# Patient Record
Sex: FEMALE | Employment: FULL TIME | URBAN - METROPOLITAN AREA
[De-identification: names, ages, dates, MRNs, and addresses within clinical notes are randomized per-mention and may not be internally consistent; named-entity substitution may affect disease eponyms.]

---

## 2024-02-20 ENCOUNTER — OFFICE VISIT (OUTPATIENT)
Dept: ORTHOPEDIC SURGERY | Age: 79
End: 2024-02-20
Payer: MEDICARE

## 2024-02-20 VITALS — BODY MASS INDEX: 30.26 KG/M2 | HEIGHT: 63 IN | WEIGHT: 170.8 LBS

## 2024-02-20 DIAGNOSIS — M25.551 RIGHT HIP PAIN: Primary | ICD-10-CM

## 2024-02-20 PROCEDURE — 99204 OFFICE O/P NEW MOD 45 MIN: CPT | Performed by: ORTHOPAEDIC SURGERY

## 2024-02-20 PROCEDURE — 1123F ACP DISCUSS/DSCN MKR DOCD: CPT | Performed by: ORTHOPAEDIC SURGERY

## 2024-02-20 NOTE — PROGRESS NOTES
Patient ID:  Kallie Colvin  [id number]  78 y.o.  1945    Today: February 20, 2024          Chief Complaint: Right Hip pain    HPI:       Kallie Colvin is a 78 y.o. 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 The patient underwent hip arthroplasty approximately 6 years ago. Patient reports  that her hip really isnt very painful. It just feels full., Further, the patient denies any recent fall or trauma.  Further the patient reports that they have previously attempted Activity modification, with significant relief.    The pain affects the patient’s activities of daily living and quality of life.       Past Medical History:  No past medical history on file.    Past Surgical History:  No past surgical history on file.     Medications:     Prior to Admission medications    Not on File       Family History:   No family history on file.    Social History:      Social History     Tobacco Use    Smoking status: Not on file    Smokeless tobacco: Not on file   Substance Use Topics    Alcohol use: Not on file         Allergies:    Not on File     Vitals:   Ht 1.6 m (5' 3\")   Wt 77.5 kg (170 lb 12.8 oz)   BMI 30.26 kg/m²     ROS:   Review of systems is done with pertinent positives and negative noted relating to orthopaedic issues. Non-orthopaedic issues are deferred to PCP      Objective:   General: Patient is awake and in no acute distress  Psych: Mood and affect appropriate  HEENT: Normocephalic. Atramatic. Pupils equal, round and reactive. Sclera normal.   Neck: Supple without obvious mass   Chest: Symmetric  Lungs:  Breathing non-labored. No tachypnea noted.  Abdomen: Soft on gross examination without obvious distention.  Neuro: No obvious neurologic deficit. Grossly moves bilateral upper extremities without motor or sensory deficits. No gross weakness noted in the lower extremities. No hyporeflexia or hyperreflexia noted.  Vascular: No gross arterial or venous deficiency noted.

## 2024-05-06 ENCOUNTER — EVALUATION (OUTPATIENT)
Age: 79
End: 2024-05-06
Payer: MEDICARE

## 2024-05-06 DIAGNOSIS — M25.551 RIGHT HIP PAIN: Primary | ICD-10-CM

## 2024-05-06 DIAGNOSIS — M62.81 MUSCLE WEAKNESS: ICD-10-CM

## 2024-05-06 DIAGNOSIS — R26.2 DIFFICULTY WALKING: ICD-10-CM

## 2024-05-06 PROCEDURE — 97162 PT EVAL MOD COMPLEX 30 MIN: CPT | Performed by: PHYSICAL THERAPIST

## 2024-05-06 PROCEDURE — 97110 THERAPEUTIC EXERCISES: CPT | Performed by: PHYSICAL THERAPIST

## 2024-05-06 RX ORDER — LOSARTAN POTASSIUM 100 MG/1
TABLET ORAL
COMMUNITY

## 2024-05-06 NOTE — PROGRESS NOTES
patient.  Kallie Colvin is a 78 y.o. female who presents to therapy today with evolving/changing clinical presentation (moderate complexity)  related to R hip and buttock pain with leg length discrepancy s/p R SAHHBAZ and weakness impacting gait. Pt would benefit from skilled physical therapy services to address the deficits noted above for return to prior level of function.    PLAN OF CARE     Effective Dates/Duration: 5/6/2024 TO 7/5/2024 (60 days).    Frequency: 2x/week   Interventions may include but are not limited to:   (04994) Therapeutic exercise to develop ROM, strength, endurance and flexibility  (70816) Therapeutic activities using dynamic activities to improve function  (58335) Gait training to address mechanics, proper step length and weight shifting to improve household and community mobility as well as overall safety with ADLs  (84594) Manual therapy techniques to improve joint and/or soft tissue mobility, ROM, and function as well as helping to decrease pain/spasms and swelling  Home exercise program (HEP) development    The referring medical provider has reviewed and approved this evaluation and plan of care as noted by the electronic signature attached to note.    GOALS     Short term goals to be met by 6/3/2024  (4 weeks):  Patient will demonstrate good recall of HEP requiring no more than minimal verbal cuing for proper form and technique.  Pt will demonstrate AROM of R hip ER to at least 15° to improve ability to to put socks/shoes on.  Pt will increase strength to at least 3/5 R hip ER and abduction for improved stability in gait.  Pt will improve Timed Up and Go to </=11 seconds for improved community mobility.    Long term goals to be met by 7/5/2024 (60 days):  Patient will be compliant and independent with a comprehensive HEP and activity progression.  Pt will demonstrate AROM of R hip ER to at least 20° to improve ability to to put socks/shoes on.  Pt will increase strength to at least 3+/5

## 2024-05-08 ENCOUNTER — TREATMENT (OUTPATIENT)
Age: 79
End: 2024-05-08
Payer: MEDICARE

## 2024-05-08 DIAGNOSIS — M62.81 MUSCLE WEAKNESS: ICD-10-CM

## 2024-05-08 DIAGNOSIS — R26.2 DIFFICULTY WALKING: ICD-10-CM

## 2024-05-08 DIAGNOSIS — M25.551 RIGHT HIP PAIN: Primary | ICD-10-CM

## 2024-05-08 PROCEDURE — 97140 MANUAL THERAPY 1/> REGIONS: CPT | Performed by: PHYSICAL THERAPIST

## 2024-05-08 PROCEDURE — 97110 THERAPEUTIC EXERCISES: CPT | Performed by: PHYSICAL THERAPIST

## 2024-05-08 NOTE — PROGRESS NOTES
[facility] ORTHOPAED[facility]  [address] [address]  Dept: [phone]      Physical Therapy Daily Note     Referring MD: Brian Bill MD  Diagnosis:     ICD-10-CM    1. Right hip pain  M25.551       2. Muscle weakness  M62.81       3. Difficulty walking  R26.2          Therapy precautions:None  Co-morbidities affecting plan of care: s/p R SHAHBAZ in [facility] ~2018 resulting in RLE shorter than L- awaiting shoe lift, diabetes  Chief complaints/history of injury:Pt had a R SHAHBAZ ~ 6 6 years ago resulting in shorter R leg and limp with walking. Pt had the surgery in [facility] and never had PT. Pt had no pain after initial recovery until ~ 40/2024 when patient started walking more and noted increase in pain.  Describe current symptoms: Pain R posterior hip and into R groin, weakness > pain, difficulty with sit-stand, daughter's main concern is stability with standing/walking  Patient Stated Goals: increase stability when walking, get up from a chair and complete stairs more easily    Payor: Payor: [facility] MEDICARE /  /  /  Billing pattern: Government- total time    Total Timed Procedure Codes: 38 min, Total Time: 38 min Modifier needed: No  8:00 am-8:38 am   Episode visit count:  2   AMN video  used, daughter present    SUBJECTIVE     Pt reports no hip pain currently. She has completed her home program with muscle soreness lateral R hip but no pain.      Medications: no changes since last session    OBJECTIVE     Treatment provided today:  Therapeutic exercise (52278) x 30 min to develop ROM, strength, endurance and flexibility of the RLE.  Nustep level 4 x 5 min  Hooklying hip adductor stretch H20sec x 3  Hooklying adductor squeeze H5sec/relax for 2 min  Beginner bridge 2x8 w/ isometric hip ER against orange R-loop  Supine long axis R hip IR/ER x 20  Manual stretching R hamstrings and hip  Standing heel raises 2x10  B gastroc stretch on incline board H1min x 2  Standing hip

## 2024-05-14 ENCOUNTER — TREATMENT (OUTPATIENT)
Age: 79
End: 2024-05-14
Payer: MEDICARE

## 2024-05-14 DIAGNOSIS — M25.551 RIGHT HIP PAIN: Primary | ICD-10-CM

## 2024-05-14 DIAGNOSIS — M62.81 MUSCLE WEAKNESS: ICD-10-CM

## 2024-05-14 DIAGNOSIS — R26.2 DIFFICULTY WALKING: ICD-10-CM

## 2024-05-14 PROCEDURE — 97140 MANUAL THERAPY 1/> REGIONS: CPT | Performed by: PHYSICAL THERAPIST

## 2024-05-14 PROCEDURE — 97110 THERAPEUTIC EXERCISES: CPT | Performed by: PHYSICAL THERAPIST

## 2024-05-14 NOTE — PROGRESS NOTES
[facility] ORTHOPAED[facility]  [address] [address]  Dept: [phone]      Physical Therapy Daily Note     Referring MD: Brian Blil MD  Diagnosis:     ICD-10-CM    1. Right hip pain  M25.551       2. Muscle weakness  M62.81       3. Difficulty walking  R26.2            Therapy precautions:None  Co-morbidities affecting plan of care: s/p R SHAHBAZ in [facility] ~2018 resulting in RLE shorter than L- awaiting shoe lift, diabetes  Chief complaints/history of injury:Pt had a R SHAHBAZ ~ 6 6 years ago resulting in shorter R leg and limp with walking. Pt had the surgery in [facility] and never had PT. Pt had no pain after initial recovery until ~ 40/2024 when patient started walking more and noted increase in pain.  Describe current symptoms: Pain R posterior hip and into R groin, weakness > pain, difficulty with sit-stand, daughter's main concern is stability with standing/walking  Patient Stated Goals: increase stability when walking, get up from a chair and complete stairs more easily    Payor: Payor: [facility] MEDICARE /  /  /  Billing pattern: Government- total time    Total Timed Procedure Codes: 40 min, Total Time: 40 min Modifier needed: No  Episode visit count:  3   AMN video  used    SUBJECTIVE     Pt reports that her hip is much better. She is doing well with her exercises but notes some pain into the R groin and hip with marching in supine to position for exercises.     Medications: no changes since last session    OBJECTIVE     Treatment provided today:  Therapeutic exercise (93004) x 32 min to develop ROM, strength, endurance and flexibility of the RLE.  Current Exercises Past Exercises (not performed today)   Assessment of tolerance of previous therapy session  Nustep level 4 x 5 min  Hooklying hip adductor stretch H20sec x 3  Hooklying adductor squeeze H5sec/relax for 2 min  Beginner bridge 3x5 w/ isometric hip ER against orange R-loop  Sidelying clamshell x 10 B  Supine

## 2024-05-16 ENCOUNTER — TREATMENT (OUTPATIENT)
Age: 79
End: 2024-05-16

## 2024-05-16 DIAGNOSIS — R26.2 DIFFICULTY WALKING: ICD-10-CM

## 2024-05-16 DIAGNOSIS — M62.81 MUSCLE WEAKNESS: ICD-10-CM

## 2024-05-16 DIAGNOSIS — M25.551 RIGHT HIP PAIN: Primary | ICD-10-CM

## 2024-05-16 NOTE — PROGRESS NOTES
[facility] INT [facility] ORTHOPAED[facility]  [address] [address]  Dept: [phone]      Physical Therapy Daily Note     Referring MD: Brian Bill MD  Diagnosis:     ICD-10-CM    1. Right hip pain  M25.551       2. Muscle weakness  M62.81       3. Difficulty walking  R26.2            Therapy precautions:None  Co-morbidities affecting plan of care: s/p R SHAHBAZ in [facility] ~2018 resulting in RLE shorter than L- awaiting shoe lift, diabetes  Chief complaints/history of injury:Pt had a R SHAHBAZ ~ 6 6 years ago resulting in shorter R leg and limp with walking. Pt had the surgery in [facility] and never had PT. Pt had no pain after initial recovery until ~ 40/2024 when patient started walking more and noted increase in pain.  Describe current symptoms: Pain R posterior hip and into R groin, weakness > pain, difficulty with sit-stand, daughter's main concern is stability with standing/walking  Patient Stated Goals: increase stability when walking, get up from a chair and complete stairs more easily    Payor: Payor: [employer] MEDICARE /  /  /  Billing pattern: Government- total time    Total Timed Procedure Codes: 40 min, Total Time: 40 min Modifier needed: No  Episode visit count:  4   AMN video  used    SUBJECTIVE     Pt reports that she is happy with the movement and decrease in pain R hip. She has difficulty putting her R sock/shoe on.     Medications: no changes since last session    OBJECTIVE     Treatment provided today:  Therapeutic exercise (58833) x 30 min to develop ROM, strength, endurance and flexibility of the RLE.  Current Exercises Past Exercises (not performed today)   Assessment of tolerance of previous therapy session  Nustep level 4 x 5 min  Hooklying hip adductor stretch H10sec x 10  Supine long axis R hip ER against yellow loop band x 20  Manual stretching R hamstrings and hip  Short arc quad 2x10 w/ focus on full extension  TKE against cook band H5sec/relax for 1 min  Standing

## 2024-05-20 ENCOUNTER — TREATMENT (OUTPATIENT)
Age: 79
End: 2024-05-20
Payer: MEDICARE

## 2024-05-20 DIAGNOSIS — M62.81 MUSCLE WEAKNESS: ICD-10-CM

## 2024-05-20 DIAGNOSIS — R26.2 DIFFICULTY WALKING: ICD-10-CM

## 2024-05-20 DIAGNOSIS — M25.551 RIGHT HIP PAIN: Primary | ICD-10-CM

## 2024-05-20 PROCEDURE — 97110 THERAPEUTIC EXERCISES: CPT | Performed by: PHYSICAL THERAPIST

## 2024-05-20 PROCEDURE — 97140 MANUAL THERAPY 1/> REGIONS: CPT | Performed by: PHYSICAL THERAPIST

## 2024-05-20 PROCEDURE — M5044 MISC THERA-BAND/TUBING: HCPCS | Performed by: PHYSICAL THERAPIST

## 2024-05-20 NOTE — PROGRESS NOTES
[facility] ORTHOPAED[facility]  [address] [address]  Dept: [phone]      Physical Therapy Daily Note     Referring MD: Brian Bill MD  Diagnosis:     ICD-10-CM    1. Right hip pain  M25.551       2. Muscle weakness  M62.81       3. Difficulty walking  R26.2            Therapy precautions:None  Co-morbidities affecting plan of care: s/p R SHAHBAZ in [facility] ~2018 resulting in RLE shorter than L- awaiting shoe lift, diabetes  Chief complaints/history of injury:Pt had a R SHAHBAZ ~ 6 6 years ago resulting in shorter R leg and limp with walking. Pt had the surgery in [facility] and never had PT. Pt had no pain after initial recovery until ~ 4/2024 when patient started walking more and noted increase in pain.  Describe current symptoms: Pain R posterior hip and into R groin, weakness > pain, difficulty with sit-stand, daughter's main concern is stability with standing/walking  Patient Stated Goals: increase stability when walking, get up from a chair and complete stairs more easily    Payor: Payor: [employer] MEDICARE /  /  /  Billing pattern: Government- total time    Total Timed Procedure Codes: 40 min, Total Time: 40 min Modifier needed: No  Episode visit count:  5   AMN video  used    SUBJECTIVE     Pt reports that it still hurts in the front and back of hip with walking but balance is a lot better. Pt has not received her shoe lift yet.     Medications: no changes since last session    OBJECTIVE     Treatment provided today:  Therapeutic exercise (69594) x 30 min to develop ROM, strength, endurance and flexibility of the RLE.  Current Exercises Past Exercises (not performed today)   Assessment of tolerance of previous therapy session  Nustep level 4 x 5 min  Hooklying hip adductor stretch H10sec x 10  Supine long axis R hip ER against yellow loop band x 20  Manual stretching R hamstrings and hip  R modified antoni stretch with therapist overpressure  Bridge 2x10  Standing hip

## 2024-05-22 ENCOUNTER — TELEPHONE (OUTPATIENT)
Age: 79
End: 2024-05-22

## 2024-05-22 NOTE — TELEPHONE ENCOUNTER
Attempted to call no answer, no vm, no mychart was asking if patient wanted to come in today at 11:45am or 5/23 @ 2:15am

## 2024-05-23 NOTE — PROGRESS NOTES
[facility] INT [facility] ORTHOPAEDICS  [address] [facility] [address]  Dept: [phone]      Physical Therapy Daily Note     Referring MD: Brian Bill MD  Diagnosis:     ICD-10-CM    1. Right hip pain  M25.551       2. Muscle weakness  M62.81       3. Difficulty walking  R26.2            Therapy precautions:None  Co-morbidities affecting plan of care: s/p R SHAHBAZ in [facility] ~2018 resulting in RLE shorter than L- awaiting shoe lift, diabetes  Chief complaints/history of injury:Pt had a R SHAHBAZ ~ 6 6 years ago resulting in shorter R leg and limp with walking. Pt had the surgery in [facility] and never had PT. Pt had no pain after initial recovery until ~ 4/2024 when patient started walking more and noted increase in pain.  Describe current symptoms: Pain R posterior hip and into R groin, weakness > pain, difficulty with sit-stand, daughter's main concern is stability with standing/walking  Patient Stated Goals: increase stability when walking, get up from a chair and complete stairs more easily    Payor: Payor: [facility] MEDICARE /  /  /  Billing pattern: Government- total time    Total Timed Procedure Codes: 40 min, Total Time: 40 min Modifier needed: No  Episode visit count:  6   AMN video  declined, daughter present during session    SUBJECTIVE     Daughter present and states that patient has not ordered a shoe lift (misunderstanding from evaluation). R leg does feel shorter. Pt with increased soreness R low back/lateral hip ~ 2 days ago but yesterday was the worst day.    Medications: no changes since last session    OBJECTIVE   Standing posture: R iliac crest and gluteal crease lower than L    Treatment provided today:  Therapeutic exercise (60390) x 25 min to develop ROM, strength, endurance and flexibility of the RLE.  Current Exercises Past Exercises (not performed today)   Assessment of tolerance of previous therapy session  Nustep level 4 x 5 min  Hooklying hip adductor stretch H10sec

## 2024-05-24 ENCOUNTER — TREATMENT (OUTPATIENT)
Age: 79
End: 2024-05-24

## 2024-05-24 DIAGNOSIS — M25.551 RIGHT HIP PAIN: Primary | ICD-10-CM

## 2024-05-24 DIAGNOSIS — R26.2 DIFFICULTY WALKING: ICD-10-CM

## 2024-05-24 DIAGNOSIS — M62.81 MUSCLE WEAKNESS: ICD-10-CM

## 2024-05-29 NOTE — PROGRESS NOTES
safety with ADLs  (68484) Manual therapy techniques to improve joint and/or soft tissue mobility, ROM, and function as well as helping to decrease pain/spasms and swelling  Home exercise program (HEP) development      GOALS     Short term goals to be met by 6/3/2024  (4 weeks):  Patient will demonstrate good recall of HEP requiring no more than minimal verbal cuing for proper form and technique.  Pt will demonstrate AROM of R hip ER to at least 15° to improve ability to to put socks/shoes on.  Pt will increase strength to at least 3/5 R hip ER and abduction for improved stability in gait.  Pt will improve Timed Up and Go to </=11 seconds for improved community mobility.    Long term goals to be met by 7/5/2024 (60 days):  Patient will be compliant and independent with a comprehensive HEP and activity progression.  Pt will demonstrate AROM of R hip ER to at least 20° to improve ability to to put socks/shoes on.  Pt will increase strength to at least 3+/5 RLE for improved stability in gait.  Pt will improve Timed Up and Go to </=10 seconds for improved community mobility.  Pt will ascend 4 steps in a reciprocal pattern with 1 rail.    [employer]  Access Code: [email or web address]  URL: [email or web address]  Date: 05/20/2024  Prepared by: Tsering Butterfield    Exercises  - Supine Hip Adductor Stretch  - 1 x daily - 1 sets - 3 reps - 20 hold  - Supine Hip Internal and External Rotation  - 1 x daily - 2 sets - 10 reps - 5 hold  - Beginner Bridge  - 1 x daily - 1-2 sets - 10 reps - 5 hold  - Clamshell  - 1 x daily - 1-2 sets - 10 reps - 3 hold  - Supine Hip Adduction Isometric with Ball  - 1 x daily - 2 sets - 10 reps - 5 hold  - Seated Hamstring Stretch  - 2 x daily - 1 sets - 3 reps - 30 hold  - Standing Heel Raise with Support  - 1 x daily - 3 sets - 10 reps  - Standing Hip Flexion with Resistance Loop  - 5 x weekly - 2-3 sets - 10 reps  - Hip Abduction with Resistance Loop  - 5 x weekly - 2-3 sets - 10 reps  - Hip

## 2024-05-30 ENCOUNTER — TREATMENT (OUTPATIENT)
Age: 79
End: 2024-05-30
Payer: MEDICARE

## 2024-05-30 DIAGNOSIS — R26.2 DIFFICULTY WALKING: ICD-10-CM

## 2024-05-30 DIAGNOSIS — M62.81 MUSCLE WEAKNESS: ICD-10-CM

## 2024-05-30 DIAGNOSIS — M25.551 RIGHT HIP PAIN: Primary | ICD-10-CM

## 2024-05-30 PROCEDURE — 97110 THERAPEUTIC EXERCISES: CPT | Performed by: PHYSICAL THERAPIST

## 2024-05-30 PROCEDURE — 97140 MANUAL THERAPY 1/> REGIONS: CPT | Performed by: PHYSICAL THERAPIST

## 2024-06-03 ENCOUNTER — TREATMENT (OUTPATIENT)
Age: 79
End: 2024-06-03
Payer: MEDICARE

## 2024-06-03 DIAGNOSIS — M25.551 RIGHT HIP PAIN: Primary | ICD-10-CM

## 2024-06-03 DIAGNOSIS — M62.81 MUSCLE WEAKNESS: ICD-10-CM

## 2024-06-03 DIAGNOSIS — R26.2 DIFFICULTY WALKING: ICD-10-CM

## 2024-06-03 PROCEDURE — 97110 THERAPEUTIC EXERCISES: CPT | Performed by: PHYSICAL THERAPIST

## 2024-06-03 PROCEDURE — 97140 MANUAL THERAPY 1/> REGIONS: CPT | Performed by: PHYSICAL THERAPIST

## 2024-06-03 NOTE — PROGRESS NOTES
[facility] PT INT [facility] ORTHOPAEDICS  [address] [place] [address]  Dept: [phone]      Physical Therapy Daily Note     Referring MD: Brian Bill MD  Diagnosis:     ICD-10-CM    1. Right hip pain  M25.551       2. Muscle weakness  M62.81       3. Difficulty walking  R26.2              Therapy precautions:None  Co-morbidities affecting plan of care: s/p R SHAHBAZ in [facility] ~2018 resulting in RLE shorter than L- awaiting shoe lift, diabetes  Chief complaints/history of injury:Pt had a R SHAHBAZ ~ 6 6 years ago resulting in shorter R leg and limp with walking. Pt had the surgery in [facility] and never had PT. Pt had no pain after initial recovery until ~ 4/2024 when patient started walking more and noted increase in pain.  Describe current symptoms: Pain R posterior hip and into R groin, weakness > pain, difficulty with sit-stand, daughter's main concern is stability with standing/walking  Patient Stated Goals: increase stability when walking, get up from a chair and complete stairs more easily    Payor: Payor: [facility] MEDICARE /  /  /  Billing pattern: Government- total time    Total Timed Procedure Codes: 40 min, Total Time: 40 min Modifier needed: No  Episode visit count:  8   AMN video  declined, daughter present during session, waiver signed 5/24/24    SUBJECTIVE     Pt reports that she is much better overall. Pt reports that she is moving easier and faster with daily activity. She notes continued discomfort at R buttock and sometimes lateral thigh.     Medications: no changes since last session    OBJECTIVE     Sit-stand: independent in 1 attempt from standard chair, slight L weight shift  Gait: independent in gym, R>L foot inversion, slight B knee flexion in stance phase    Findings 5/6/2024  6/3/2024        Timed Up and Go 12.4 sec 11.2 sec    30 sec chair stand 10  reps 12 reps    R hip AROM      flexion 105 108    ER 10 12    Knee extension AROM R -15, L -8 R -10    MMT (0-5)

## 2024-06-05 ENCOUNTER — TREATMENT (OUTPATIENT)
Age: 79
End: 2024-06-05
Payer: MEDICARE

## 2024-06-05 DIAGNOSIS — R26.2 DIFFICULTY WALKING: ICD-10-CM

## 2024-06-05 DIAGNOSIS — M25.551 RIGHT HIP PAIN: Primary | ICD-10-CM

## 2024-06-05 DIAGNOSIS — M62.81 MUSCLE WEAKNESS: ICD-10-CM

## 2024-06-05 PROCEDURE — 97110 THERAPEUTIC EXERCISES: CPT | Performed by: PHYSICAL THERAPIST

## 2024-06-05 PROCEDURE — 97112 NEUROMUSCULAR REEDUCATION: CPT | Performed by: PHYSICAL THERAPIST

## 2024-06-05 NOTE — PROGRESS NOTES
glut squeeze at top, CGA  March with 5 sec hold at top, 1UE support x 10 B  Single leg stance with intermittent UE support [id number] x 3 B  Standing postural correction w/ B hip ER, tactile cues x 20    ASSESSMENT     Pt did well with balance activities and focused on standing posture with correction of hip flexion and IR. Pain levels  have improved overall and pt continues to progress well towards goals.      PLAN     Continue with hip strengthening, balance . Update HEP    Effective Dates/Duration: 5/6/2024 TO 7/5/2024 (60 days).    Frequency: 2x/week   Interventions may include but are not limited to:   (81738) Therapeutic exercise to develop ROM, strength, endurance and flexibility  (53861) Therapeutic activities using dynamic activities to improve function  (04777) Gait training to address mechanics, proper step length and weight shifting to improve household and community mobility as well as overall safety with ADLs  (25612) Manual therapy techniques to improve joint and/or soft tissue mobility, ROM, and function as well as helping to decrease pain/spasms and swelling  Home exercise program (HEP) development      GOALS     Short term goals to be met by 6/3/2024  (4 weeks):  Patient will demonstrate good recall of HEP requiring no more than minimal verbal cuing for proper form and technique. Goal Met 6/3/2024   Pt will demonstrate AROM of R hip ER to at least 15° to improve ability to to put socks/shoes on. Goal Not Met 6/3/2024 - Continue (at12°)  Pt will increase strength to at least 3/5 R hip ER and abduction for improved stability in gait. Goal Met 6/3/2024   Pt will improve Timed Up and Go to </=11 seconds for improved community mobility. Goal Not Met 6/3/2024 - Continue (at 11.2)    Long term goals to be met by 7/5/2024 (60 days):  Patient will be compliant and independent with a comprehensive HEP and activity progression.  Pt will demonstrate AROM of R hip ER to at least 20° to improve ability to to put

## 2024-06-19 ENCOUNTER — TREATMENT (OUTPATIENT)
Age: 79
End: 2024-06-19
Payer: MEDICARE

## 2024-06-19 DIAGNOSIS — M25.551 RIGHT HIP PAIN: Primary | ICD-10-CM

## 2024-06-19 DIAGNOSIS — R26.2 DIFFICULTY WALKING: ICD-10-CM

## 2024-06-19 DIAGNOSIS — M62.81 MUSCLE WEAKNESS: ICD-10-CM

## 2024-06-19 PROCEDURE — 97140 MANUAL THERAPY 1/> REGIONS: CPT | Performed by: PHYSICAL THERAPIST

## 2024-06-19 PROCEDURE — 97110 THERAPEUTIC EXERCISES: CPT | Performed by: PHYSICAL THERAPIST

## 2024-06-19 PROCEDURE — 97112 NEUROMUSCULAR REEDUCATION: CPT | Performed by: PHYSICAL THERAPIST

## 2024-06-19 NOTE — PROGRESS NOTES
[facility] PT INT [facility] ORTHOPAEDICS  [address] [facility] [address]  Dept: [phone]      Physical Therapy Daily Note     Referring MD: Brian Bill MD  Diagnosis:     ICD-10-CM    1. Right hip pain  M25.551       2. Muscle weakness  M62.81       3. Difficulty walking  R26.2                Therapy precautions:None  Co-morbidities affecting plan of care: s/p R SHAHBAZ in [facility] ~2018 resulting in RLE shorter than L- awaiting shoe lift, diabetes  Chief complaints/history of injury:Pt had a R SHAHBAZ ~ 6 6 years ago resulting in shorter R leg and limp with walking. Pt had the surgery in [facility] and never had PT. Pt had no pain after initial recovery until ~ 4/2024 when patient started walking more and noted increase in pain.  Describe current symptoms: Pain R posterior hip and into R groin, weakness > pain, difficulty with sit-stand, daughter's main concern is stability with standing/walking  Patient Stated Goals: increase stability when walking, get up from a chair and complete stairs more easily    Payor: Payor: [facility] MEDICARE /  /  /  Billing pattern: Government- total time    Total Timed Procedure Codes: 40 min, Total Time: 40 min Modifier needed: No  Episode visit count:  10   AMN video  declined, daughter present during session, waiver signed 5/24/24    SUBJECTIVE     Heel lifts arrived and pt brought them to session. Pt notes R buttock/low back discomfort. Daughter reports that family went to [place], [place] this weekend and pt was able to walk down the steps and [place] the [place] time. She had previously been too nervous about stability.    Medications: no changes since last session    OBJECTIVE       Findings 5/6/2024  6/3/2024        Timed Up and Go 12.4 sec 11.2 sec    30 sec chair stand 10  reps 12 reps    R hip AROM      flexion 105 108    ER 10 12    Knee extension AROM R -15, L -8 R -10    MMT (0-5)      R hip extension 3- 3+    R hip abduction 3- 3    R hip ER 3- 3

## 2024-06-26 ENCOUNTER — TREATMENT (OUTPATIENT)
Age: 79
End: 2024-06-26
Payer: MEDICARE

## 2024-06-26 DIAGNOSIS — M25.551 RIGHT HIP PAIN: Primary | ICD-10-CM

## 2024-06-26 DIAGNOSIS — R26.2 DIFFICULTY WALKING: ICD-10-CM

## 2024-06-26 DIAGNOSIS — M62.81 MUSCLE WEAKNESS: ICD-10-CM

## 2024-06-26 PROCEDURE — 97140 MANUAL THERAPY 1/> REGIONS: CPT | Performed by: PHYSICAL THERAPIST

## 2024-06-26 PROCEDURE — 97110 THERAPEUTIC EXERCISES: CPT | Performed by: PHYSICAL THERAPIST

## 2024-06-26 PROCEDURE — 97112 NEUROMUSCULAR REEDUCATION: CPT | Performed by: PHYSICAL THERAPIST

## 2024-06-26 NOTE — PROGRESS NOTES
[facility]  [address] [place] [id number]  Dept: [phone]      Physical Therapy Discharge     Referring MD: Brian Bill MD  Diagnosis:     ICD-10-CM    1. Right hip pain  M25.551       2. Muscle weakness  M62.81       3. Difficulty walking  R26.2                Therapy precautions:None  Co-morbidities affecting plan of care: s/p R SHAHBAZ in [facility] ~2018 resulting in RLE shorter than L- awaiting shoe lift, diabetes  Chief complaints/history of injury:Pt had a R SHAHBAZ ~ 6 6 years ago resulting in shorter R leg and limp with walking. Pt had the surgery in [facility] and never had PT. Pt had no pain after initial recovery until ~ 4/2024 when patient started walking more and noted increase in pain.  Describe current symptoms: Pain R posterior hip and into R groin, weakness > pain, difficulty with sit-stand, daughter's main concern is stability with standing/walking  Patient Stated Goals: increase stability when walking, get up from a chair and complete stairs more easily    Payor: Payor: [employer] /  /  /  Billing pattern: Government- total time    Total Timed Procedure Codes: 40 min, Total Time: 45 min Modifier needed: No  Episode visit count:  11   [facility] video  declined, daughter present during session, waiver signed 5/24/24    SUBJECTIVE     Pt reports no significant change with use of heel lift. It did not seem to increase or decrease symptoms. Overall, hp and low back pain are improved with continued lower level discomfort. Pt is performing her home program without difficulty.     Medications: no changes since last session    OBJECTIVE     Gait: independent in clinic with B knee flexion throughout gait cycle and B hip IR  Stairs: reciprocal up/down w/ 1 rail  Posture: added 2nd level to adjustable heel lift and pt noted improved stability, pelvic landmarks symmetrical    Findings 5/6/2024  6/3/2024   6/26/24     Timed Up and Go 12.4 sec 11.2 sec 9.7 sec